# Patient Record
Sex: MALE | Race: OTHER | NOT HISPANIC OR LATINO | URBAN - METROPOLITAN AREA
[De-identification: names, ages, dates, MRNs, and addresses within clinical notes are randomized per-mention and may not be internally consistent; named-entity substitution may affect disease eponyms.]

---

## 2024-01-01 ENCOUNTER — EMERGENCY (EMERGENCY)
Facility: HOSPITAL | Age: 64
LOS: 1 days | End: 2024-01-01
Attending: STUDENT IN AN ORGANIZED HEALTH CARE EDUCATION/TRAINING PROGRAM
Payer: SELF-PAY

## 2024-01-01 VITALS — WEIGHT: 164.91 LBS | HEIGHT: 67 IN

## 2024-01-01 PROCEDURE — 99285 EMERGENCY DEPT VISIT HI MDM: CPT | Mod: 25

## 2024-01-01 PROCEDURE — 99053 MED SERV 10PM-8AM 24 HR FAC: CPT

## 2024-01-01 PROCEDURE — 99285 EMERGENCY DEPT VISIT HI MDM: CPT

## 2024-01-01 PROCEDURE — 92950 HEART/LUNG RESUSCITATION CPR: CPT

## 2024-01-01 PROCEDURE — 82962 GLUCOSE BLOOD TEST: CPT

## 2024-02-03 NOTE — ED PROVIDER NOTE - CLINICAL SUMMARY MEDICAL DECISION MAKING FREE TEXT BOX
63M brought in as cardiac arrest. patent given 6 epi, 100mg lido given in the field. CPR in progress on patient arrival. given a total of 3 epi, 1amp bicarb, 1g calcium, 1amp dextrose. patient intubated during code. patient remained in asystole with no cardiac activity on bedside ultrasound. time of death 2329.     wife( Bernard Cardona) and nephew now in the emergency department. informed of expiration.     organ donation contacted and accepted the case.

## 2024-02-03 NOTE — ED PROVIDER NOTE - PHYSICAL EXAMINATION
General: severe distress   HEENT: normocephalic, atraumatic   Respiratory: bagged   Cardiac: asystole, cool extremities

## 2024-02-03 NOTE — ED PROVIDER NOTE - OBJECTIVE STATEMENT
63-year-old male, no significant PMH, brought in as notification for cardiac arrest.  EMS reports that the patient was found on the [place] unresponsive.  Upon EMS arrival ACLS was started.  Report the patient received 2 shocks but was otherwise in asystole.  EMS gave the patient 100 mg of lidocaine and 6 doses of epinephrine.

## 2024-02-03 NOTE — ED ADULT NURSE NOTE - OBJECTIVE STATEMENT
As per EMS, Patient BIBA. patient brought via EMS, unresponsive. Pt found on the train unresponsive by conductor. ACLS protocol initiated. Patient continued to be unrespons As per EMS, Patient BIBA. patient brought via EMS, unresponsive. Pt found on the train unresponsive by conductor. ACLS protocol initiated upon arrival to ED. Patient continued to be unresponsive. Patient pronounced at 23:29

## 2024-02-03 NOTE — ED ADULT NURSE NOTE - CHIEF COMPLAINT QUOTE
BIBA as a cardiac arrest notification, pt was a passenger in [facility] and was found unresponsive by conductor, ems states they worked to resuscitate pt for > 40 minutes prior to ED arrival, pt received 2 defib shocks, epi IVP x5 and lidocaine 100mg IVP from ems prior to arrival, no ROSC achieved during resuscitation attempt by ems received by , NP Connor, RT, RN Yolanda Dorantes and Lauryn upon arrival

## 2024-02-03 NOTE — ED ADULT TRIAGE NOTE - CHIEF COMPLAINT QUOTE
BIBA as a cardiac arrest notification, pt was a passenger in [facility] and was found unresponsive by conductor, ems states they worked to resuscitate pt for > 40 minutes prior to ED arrival, pt received 2 defib shocks, epi IVP x5 and lidocaine 100mg IVP from ems prior to arrival, no ROSC achieved during resuscitation attempt by ems received by , NP Connor, RT, RN Yolanda Dorantes and Lauryn upon arrival
